# Patient Record
Sex: FEMALE
[De-identification: names, ages, dates, MRNs, and addresses within clinical notes are randomized per-mention and may not be internally consistent; named-entity substitution may affect disease eponyms.]

---

## 2019-09-20 ENCOUNTER — HOSPITAL ENCOUNTER (EMERGENCY)
Dept: HOSPITAL 97 - ER | Age: 54
Discharge: HOME | End: 2019-09-20
Payer: COMMERCIAL

## 2019-09-20 VITALS — OXYGEN SATURATION: 98 % | TEMPERATURE: 97.7 F | SYSTOLIC BLOOD PRESSURE: 133 MMHG | DIASTOLIC BLOOD PRESSURE: 84 MMHG

## 2019-09-20 DIAGNOSIS — R05: ICD-10-CM

## 2019-09-20 DIAGNOSIS — R10.84: Primary | ICD-10-CM

## 2019-09-20 LAB
ALBUMIN SERPL BCP-MCNC: 4.2 G/DL (ref 3.4–5)
ALP SERPL-CCNC: 113 U/L (ref 45–117)
ALT SERPL W P-5'-P-CCNC: 28 U/L (ref 12–78)
AST SERPL W P-5'-P-CCNC: 22 U/L (ref 15–37)
BUN BLD-MCNC: 13 MG/DL (ref 7–18)
GLUCOSE SERPLBLD-MCNC: 88 MG/DL (ref 74–106)
HCT VFR BLD CALC: 40.4 % (ref 36–45)
INR BLD: 1.04
LYMPHOCYTES # SPEC AUTO: 2.1 K/UL (ref 0.7–4.9)
MAGNESIUM SERPL-MCNC: 2.4 MG/DL (ref 1.8–2.4)
NT-PROBNP SERPL-MCNC: 64 PG/ML (ref ?–125)
PMV BLD: 10.5 FL (ref 7.6–11.3)
POTASSIUM SERPL-SCNC: 3.8 MMOL/L (ref 3.5–5.1)
RBC # BLD: 4.42 M/UL (ref 3.86–4.86)
TROPONIN (EMERG DEPT USE ONLY): < 0.02 NG/ML (ref 0–0.04)

## 2019-09-20 PROCEDURE — 76705 ECHO EXAM OF ABDOMEN: CPT

## 2019-09-20 PROCEDURE — 85610 PROTHROMBIN TIME: CPT

## 2019-09-20 PROCEDURE — 93005 ELECTROCARDIOGRAM TRACING: CPT

## 2019-09-20 PROCEDURE — 84484 ASSAY OF TROPONIN QUANT: CPT

## 2019-09-20 PROCEDURE — 85025 COMPLETE CBC W/AUTO DIFF WBC: CPT

## 2019-09-20 PROCEDURE — 80076 HEPATIC FUNCTION PANEL: CPT

## 2019-09-20 PROCEDURE — 99284 EMERGENCY DEPT VISIT MOD MDM: CPT

## 2019-09-20 PROCEDURE — 83735 ASSAY OF MAGNESIUM: CPT

## 2019-09-20 PROCEDURE — 80048 BASIC METABOLIC PNL TOTAL CA: CPT

## 2019-09-20 PROCEDURE — 83880 ASSAY OF NATRIURETIC PEPTIDE: CPT

## 2019-09-20 PROCEDURE — 36415 COLL VENOUS BLD VENIPUNCTURE: CPT

## 2019-09-20 PROCEDURE — 71045 X-RAY EXAM CHEST 1 VIEW: CPT

## 2019-09-20 NOTE — RAD REPORT
EXAM DESCRIPTION:  RAD - Chest Single View - 9/20/2019 12:25 pm

 

CLINICAL HISTORY:  Chest and abdominal pain, known situs inversus

 

COMPARISON:  None.

 

TECHNIQUE:  AP portable chest image was obtained 1217 hours .

 

FINDINGS:  Lung fields are clear. Heart size and vasculature are normal. Heart and aortic arch are on
 the right on this film. Provided information indicates that situs inversus is a known finding in thi
s patient. No measurable pleural effusion and no pneumothorax. No acute bony abnormality seen. No acu
te aortic findings suspected.

 

IMPRESSION:  No acute cardiopulmonary process.

## 2019-09-20 NOTE — RAD REPORT
EXAM DESCRIPTION:  US - Abdomen Exam Limited - 9/20/2019 1:27 pm

 

CLINICAL HISTORY:  ABD PAIN

 

COMPARISON:  No comparisons

 

FINDINGS:  No gallstones, sludge or other abnormalities within the gallbladder lumen. There is no wal
l thickening or pericholecystic fluid.

 

No common duct stone or biliary tree dilatation identified.

 

IMPRESSION:  Normal gallbladder and biliary tree ultrasound.

## 2019-09-20 NOTE — EDPHYS
Physician Documentation                                                                           

 [facility]                                                                 

Name: Brenda Terry                                                                             

Age: 54 yrs                                                                                       

Sex: Female                                                                                       

: 1965                                                                                   

MRN: [id number]                                                                                   

Arrival Date: 2019                                                                          

Time: 10:15                                                                                       

Account#: [id number]                                                                            

Bed 19                                                                                            

Private MD: Unknown, Unknown                                                                      

ED Physician Wally Brooks                                                                       

HPI:                                                                                              

                                                                                             

11:57 This 54 yrs old  Female presents to ER via Ambulatory with complaints of        snw 

      Abdominal Pain, Chest Pain.                                                                 

11:57 The patient presents with abdominal pain in the epigastric area, in the upper abdomen,  snw 

      in the left upper quadrant. Onset: The symptoms/episode began/occurred gradually, 2         

      day(s) ago, and became persistent. The symptoms do not radiate. Associated signs and        

      symptoms: none. The symptoms are described as crampy. Severity of pain: At its worst        

      the pain was moderate. It is unknown whether or not the patient has had similar             

      symptoms in the past. It is unknown whether or not the patient has recently seen a          

      physician.                                                                                  

                                                                                                  

OB/GYN:                                                                                           

10:35 LMP N/A - Hysterectomy                                                                  aj1 

                                                                                                  

Historical:                                                                                       

- Allergies:                                                                                      

10:35 No Known Allergies;                                                                     aj1 

- Home Meds:                                                                                      

10:35 None [Active];                                                                          aj1 

- PMHx:                                                                                           

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 

- PSHx:                                                                                           

10:35 Hysterectomy;                                                                           aj1 

                                                                                                  

- Immunization history:: Flu vaccine is not up to date.                                           

- Social history:: Smoking status: Patient/guardian denies using tobacco.                         

- Ebola Screening: : Patient denies travel to an Ebola-affected area in the 21 days               

  before illness onset.                                                                           

                                                                                                  

                                                                                                  

ROS:                                                                                              

11:57 Constitutional: Negative for fever, chills, and weight loss, Eyes: Negative for injury, snw 

      pain, redness, and discharge, ENT: Negative for injury, pain, and discharge, Neck:          

      Negative for injury, pain, and swelling, Cardiovascular: Negative for chest pain,           

      palpitations, and edema, Abdomen/GI: Negative for nausea, vomiting, diarrhea, and           

      constipation, + upper left abd pain Back: Negative for injury and pain.                     

11:57 : Negative for injury, bleeding, discharge, and swelling, MS/Extremity: Negative for      

      injury and deformity, Skin: Negative for injury, rash, and discoloration, Neuro:            

      Negative for headache, weakness, numbness, tingling, and seizure, Psych: Negative for       

      depression, anxiety, suicide ideation, homicidal ideation, and hallucinations.              

11:57 Respiratory: Positive for cough, with no reported sputum.                                   

                                                                                                  

Exam:                                                                                             

11:57 Constitutional:  This is a well developed, well nourished patient who is awake, alert,  snw 

      and in no acute distress. Head/Face:  Normocephalic, atraumatic. Eyes:  Pupils equal        

      round and reactive to light, extra-ocular motions intact.  Lids and lashes normal.          

      Conjunctiva and sclera are non-icteric and not injected.  Cornea within normal limits.      

      Periorbital areas with no swelling, redness, or edema. ENT:  Nares patent. No nasal         

      discharge, no septal abnormalities noted.  Tympanic membranes are normal and external       

      auditory canals are clear.  Oropharynx with no redness, swelling, or masses, exudates,      

      or evidence of obstruction, uvula midline.  Mucous membranes moist. Neck:  Trachea          

      midline, no thyromegaly or masses palpated, and no cervical lymphadenopathy.  Supple,       

      full range of motion without nuchal rigidity, or vertebral point tenderness.  No            

      Meningismus. Cardiovascular:  Regular rate and rhythm with a normal S1 and S2.  No          

      gallops, murmurs, or rubs.  Normal PMI, no JVD.  No pulse deficits. Respiratory:  Lungs     

      have equal breath sounds bilaterally, clear to auscultation and percussion.  No rales,      

      rhonchi or wheezes noted.  No increased work of breathing, no retractions or nasal          

      flaring. Abdomen/GI:  Soft, non-tender, with normal bowel sounds.  No distension or         

      tympany.  No guarding or rebound.  No evidence of tenderness throughout. Back:  No          

      spinal tenderness.  No costovertebral tenderness.  Full range of motion. Skin:  Warm,       

      dry with normal turgor.  Normal color with no rashes, no lesions, and no evidence of        

      cellulitis. MS/ Extremity:  Pulses equal, no cyanosis.  Neurovascular intact.  Full,        

      normal range of motion. Neuro:  Awake and alert, GCS 15, oriented to person, place,         

      time, and situation.  Cranial nerves II-XII grossly intact.  Motor strength 5/5 in all      

      extremities.  Sensory grossly intact.  Cerebellar exam normal.  Normal gait. Psych:         

      Awake, alert, with orientation to person, place and time.  Behavior, mood, and affect       

      are within normal limits.                                                                   

11:57 Chest/axilla: Inspection: normal, Palpation: is normal.                                     

                                                                                                  

Vital Signs:                                                                                      

10:35  / 84; Pulse 55; Resp 18; Temp 97.7; Pulse Ox 98% on R/A; Weight 81.65 kg (R);    aj1 

      Height 5 ft. 4 in. (162.56 cm) (R); Pain 7/10;                                              

12:05  / 75; Pulse 50; Resp 16 S; Pulse Ox 100% on R/A;                                 aa5 

13:00  / 71; Pulse 61; Resp 16 S; Pulse Ox 100% on R/A;                                 aa5 

14:45  / 68; Pulse 61; Resp 16 S; Temp 98.0(TE); Pulse Ox 98% on R/A; Pain 0/10;        aa5 

16:00  / 88; Pulse 52; Resp 16 S; Pulse Ox 98% on R/A;                                  aa5 

17:00  / 74; Pulse 52; Resp 18 S; Temp 97.8(TE); Pulse Ox 99% on R/A; Pain 0/10;        aa5 

10:35 Body Mass Index 30.90 (81.65 kg, 162.56 cm)                                             aj1 

                                                                                                  

MDM:                                                                                              

11:39 Patient medically screened.                                                             snw 

16:26 Data reviewed: vital signs, nurses notes. Data interpreted: Pulse oximetry: on room air snw 

      is 98 %. Interpretation: normal. Counseling: I had a detailed discussion with the           

      patient and/or guardian regarding: the historical points, exam findings, and any            

      diagnostic results supporting the discharge/admit diagnosis, the presence of at least       

      one elevated blood pressure reading (>120/80) during this emergency department visit,       

      lab results, radiology results, the need for outpatient follow up, to return to the         

      emergency department if symptoms worsen or persist or if there are any questions or         

      concerns that arise at home. Special discussion: Based on the history and exam              

      findings, there is no indication for further emergent testing or inpatient evaluation.      

      I discussed with the patient/guardian the need to see the primary care provider for         

      further evaluation of the symptoms.                                                         

                                                                                                  

                                                                                             

11:43 Order name: Basic Metabolic Panel; Complete Time: 12:39                                 snw 

                                                                                             

11:43 Order name: CBC with Diff; Complete Time: 12:18                                         snw 

                                                                                             

11:43 Order name: LFT's; Complete Time: 12:39                                                 snw 

                                                                                             

11:43 Order name: Magnesium; Complete Time: 12:39                                             w 

                                                                                             

11:43 Order name: NT PRO-BNP; Complete Time: 12:39                                                                                                                                         

11:43 Order name: PT-INR; Complete Time: 12:18                                                                                                                                             

11:43 Order name: Troponin (emerg Dept Use Only); Complete Time: 12:39                                                                                                                     

11:43 Order name: XRAY Chest (1 view); Complete Time: 12:57                                                                                                                                

11:43 Order name: EKG; Complete Time: 11:47                                                                                                                                                

11:43 Order name: Cardiac monitoring; Complete Time: 11:59                                                                                                                                 

11:43 Order name: EKG - Nurse/Tech; Complete Time: 11:59                                                                                                                                   

12:49 Order name: US Abdomen Limited; Complete Time: 14:36                                                                                                                                 

15:19 Order name: EKG; Complete Time: 15:20                                                                                                                                                

15:19 Order name: Troponin (emerg Dept Use Only); Complete Time: 16:24                                                                                                                     

11:43 Order name: IV Saline Lock; Complete Time: 11:59                                                                                                                                     

11:43 Order name: Labs collected and sent; Complete Time: 11:59                                                                                                                            

11:43 Order name: O2 Per Protocol; Complete Time: 11:59                                                                                                                                    

11:43 Order name: O2 Sat Monitoring; Complete Time: 11:59                                     snw 

                                                                                                  

Administered Medications:                                                                         

No medications were administered                                                                  

                                                                                                  

                                                                                                  

Disposition:                                                                                      

19 16:25 Discharged to Home. Impression: Colic.                                             

- Condition is Stable.                                                                            

- Discharge Instructions: Biliary Colic, Adult, Fat and Cholesterol Restricted Diet,              

  High-Fiber Diet.                                                                                

- Prescriptions for Bentyl 20 mg Oral Tablet - take 1 tablet by ORAL route every 6                

  hours As needed; 20 tablet.                                                                     

- Work release form, Family Work Release, Medication Reconciliation Form, Thank You               

  Letter, Antibiotic Education, Prescription Opioid Use form.                                     

- Follow up: Private Physician; When: 2 - 3 days; Reason: Recheck today's complaints,             

  Continuance of care, Re-evaluation by your physician. Follow up: Emergency                      

  Department; When: As needed; Reason: Worsening of condition.                                    

                                                                                                  

                                                                                                  

                                                                                                  

Addendum:                                                                                         

2019                                                                                        

     08:58 Co-signature as Attending Physician, Wally Brooks MD I agree with the assessment and   k
dr

           plan of care.                                                                          

                                                                                                  

Signatures:                                                                                       

Dispatcher [employer]                           EDSharon Comer, RN                     RN   aj1                                                  

Wally Brooks MD MD   [facility]                                                  

Cherry Regalado, FNP-C                 FNP-Csnw                                                  

Alisha Maya, RN                     RN   aa5                                                  

                                                                                                  

Corrections: (The following items were deleted from the chart)                                    

                                                                                             

17:16 16:25 2019 16:25 Discharged to Home. Impression: Colic. Condition is Stable.      aa5 

      Forms are Medication Reconciliation Form, Thank You Letter, Antibiotic Education,           

      Prescription Opioid Use. Follow up: Private Physician; When: 2 - 3 days; Reason:            

      Recheck today's complaints, Continuance of care, Re-evaluation by your physician.           

      Follow up: Emergency Department; When: As needed; Reason: Worsening of condition. snw       

                                                                                                  

**************************************************************************************************

## 2019-09-20 NOTE — ER
Nurse's Notes                                                                                     

 [facility]                                                                 

Name: Brenda Terry                                                                             

Age: 54 yrs                                                                                       

Sex: Female                                                                                       

: 1965                                                                                   

MRN: [id number]                                                                                   

Arrival Date: 2019                                                                          

Time: 10:15                                                                                       

Account#: [id number]                                                                            

Bed 19                                                                                            

Private MD: Unknown, Unknown                                                                      

Diagnosis: Colic                                                                                  

                                                                                                  

Presentation:                                                                                     

                                                                                             

10:32 Presenting complaint: Child states: "She's having pain in her right lower chest and     aj1 

      upper abdomen, it started 2 days ago, and it radiates from her back to the front"           

      Denies shortness of breath, palpitations, fever. Reports that the pain is worse with        

      movement. Transition of care: patient was not received from another setting of care.        

      Onset of symptoms was 2019. Risk Assessment: Do you want to hurt yourself or      

      someone else? Patient reports no desire to harm self or others. Initial Sepsis Screen:      

      Does the patient meet any 2 criteria? No. Patient's initial sepsis screen is negative.      

      Does the patient have a suspected source of infection? No. Patient's initial sepsis         

      screen is negative. Care prior to arrival: None.                                            

10:32 Method Of Arrival: Ambulatory                                                           aj1 

10:32 Acuity: JESUS 3                                                                           aj1 

                                                                                                  

Triage Assessment:                                                                                

10:35 General: Appears in no apparent distress. comfortable, Behavior is calm, cooperative,   aj1 

      appropriate for age. Pain: Complains of pain in diaphragm and left upper quadrant Pain      

      radiates to back Pain currently is 7 out of 10 on a pain scale. Quality of pain is          

      described as pressure, Pain began 2-3 days ago. Is intermittent. Neuro: Level of            

      Consciousness is awake, alert, obeys commands, Oriented to person, place, time,             

      situation. Cardiovascular: Reports chest pain, Denies palpitations, shortness of            

      breath, Patient's skin is warm and dry. Respiratory: Airway is patent Respiratory           

      effort is even, unlabored, Respiratory pattern is regular, symmetrical. GI: Reports         

      upper abdominal pain, Patient currently denies diarrhea, nausea, vomiting.                  

                                                                                                  

OB/GYN:                                                                                           

10:35 LMP N/A - Hysterectomy                                                                  aj1 

                                                                                                  

Historical:                                                                                       

- Allergies:                                                                                      

10:35 No Known Allergies;                                                                     aj1 

- Home Meds:                                                                                      

10:35 None [Active];                                                                          aj1 

- PMHx:                                                                                           

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 

- PSHx:                                                                                           

10:35 Hysterectomy;                                                                           aj1 

                                                                                                  

- Immunization history:: Flu vaccine is not up to date.                                           

- Social history:: Smoking status: Patient/guardian denies using tobacco.                         

- Ebola Screening: : Patient denies travel to an Ebola-affected area in the 21 days               

  before illness onset.                                                                           

                                                                                                  

                                                                                                  

Screening:                                                                                        

10:40 Abuse screen: Denies threats or abuse. Nutritional screening: No deficits noted.        aa5 

      Tuberculosis screening: No symptoms or risk factors identified. Fall Risk None              

      identified.                                                                                 

                                                                                                  

Assessment:                                                                                       

11:40 General: Appears comfortable, Behavior is calm, cooperative. Pain: Complains of pain in aa5 

      left mid back Pain radiates to right upper quadrant and left upper quadrant Pain            

      currently is 5 out of 10 on a pain scale. Quality of pain is described as sharp, Is         

      intermittent. Neuro: Level of Consciousness is awake, alert, obeys commands, Oriented       

      to person, place, time, situation. Cardiovascular: Denies chest pain, diaphoresis,          

      lightheadedness, nausea, palpitations, shortness of breath, syncope, vomiting, Heart        

      tones S1 S2 present Capillary refill < 3 seconds is brisk in bilateral fingers              

      Patient's skin is warm and dry. Edema is absent. Rhythm is regular. Respiratory:            

      Reports intermittent slight cough. Airway is patent Respiratory effort is even,             

      unlabored, Respiratory pattern is regular, symmetrical, Breath sounds are clear             

      bilaterally. GI: Abdomen is round non-distended, Bowel sounds present X 4 quads. Abd is     

      soft and non tender X 4 quads. Patient currently denies nausea, vomiting. : No signs      

      and/or symptoms were reported regarding the genitourinary system. EENT: No signs and/or     

      symptoms were reported regarding the EENT system. Derm: Skin is pink, warm \T\ dry.         

      Musculoskeletal: Range of motion: intact in all extremities.                                

12:30 Reassessment: Patient is alert, oriented x 3, equal unlabored respirations, skin        aa5 

      warm/dry/pink.                                                                              

12:30 Pain: Pain currently is 4 out of 10 on a pain scale.                                    aa5 

13:30 Reassessment: Patient is alert, oriented x 3, equal unlabored respirations, skin        aa5 

      warm/dry/pink. Patient denies pain at this time.                                            

14:40 Reassessment: Patient is alert, oriented x 3, equal unlabored respirations, skin        aa5 

      warm/dry/pink. Patient denies pain at this time.                                            

15:32 Reassessment: Patient is alert, oriented x 3, equal unlabored respirations, skin        aa5 

      warm/dry/pink. Patient denies pain at this time. Repeat EKG completed by EKG tech and       

      repeat troponin drawn and sent to lab. .                                                    

16:30 Reassessment: Patient is alert, oriented x 3, equal unlabored respirations, skin        aa5 

      warm/dry/pink. Patient denies pain at this time.                                            

17:15 Reassessment: Patient is alert, oriented x 3, equal unlabored respirations, skin        aa5 

      warm/dry/pink. Patient denies pain at this time.                                            

                                                                                                  

Vital Signs:                                                                                      

10:35  / 84; Pulse 55; Resp 18; Temp 97.7; Pulse Ox 98% on R/A; Weight 81.65 kg (R);    aj1 

      Height 5 ft. 4 in. (162.56 cm) (R); Pain 7/10;                                              

12:05  / 75; Pulse 50; Resp 16 S; Pulse Ox 100% on R/A;                                 aa5 

13:00  / 71; Pulse 61; Resp 16 S; Pulse Ox 100% on R/A;                                 aa5 

14:45  / 68; Pulse 61; Resp 16 S; Temp 98.0(TE); Pulse Ox 98% on R/A; Pain 0/10;        aa5 

16:00  / 88; Pulse 52; Resp 16 S; Pulse Ox 98% on R/A;                                  aa5 

17:00  / 74; Pulse 52; Resp 18 S; Temp 97.8(TE); Pulse Ox 99% on R/A; Pain 0/10;        aa5 

10:35 Body Mass Index 30.90 (81.65 kg, 162.56 cm)                                             aj1 

                                                                                                  

ED Course:                                                                                        

10:15 Patient arrived in ED.                                                                  ag5 

10:16 Unknown, Unknown is Private Physician.                                                  ag5 

10:34 Triage completed.                                                                       aj1 

10:35 Arm band placed on Patient placed in waiting room, Patient notified of wait time.       aj1 

10:40 Patient has correct armband on for positive identification. Placed in gown. Bed in low  aa5 

      position. Call light in reach. Side rails up X2. Cardiac monitor on. Pulse ox on. NIBP      

      on.                                                                                         

11:28 Alisha Maya, RN is Primary Nurse.                                                   aa5 

11:37 Cherry Regalado FNP-C is PHCP.                                                        snw 

11:37 Wally Brooks MD is Attending Physician.                                              snw 

11:55 Initial lab(s) drawn, by me, sent to lab. Inserted saline lock: 20 gauge in right       aa5 

      antecubital area, using aseptic technique. Blood collected.                                 

12:28 XRAY Chest (1 view) In Process Unspecified.                                             EDMS

13:32 US Abdomen Limited In Process Unspecified.                                              EDMS

15:47 EKG done, by EKG tech. reviewed by Cherry CONTRERAS.                               sm3 

17:15 No provider procedures requiring assistance completed. IV discontinued, intact,         aa5 

      bleeding controlled, No redness/swelling at site. Pressure dressing applied.                

                                                                                                  

Administered Medications:                                                                         

No medications were administered                                                                  

                                                                                                  

                                                                                                  

Outcome:                                                                                          

16:25 Discharge ordered by MD.                                                                snw 

17:15 Discharged to home ambulatory, with family.                                             aa5 

17:15 Condition: stable                                                                           

17:15 Discharge instructions given to patient, Instructed on discharge instructions, follow       

      up and referral plans. medication usage, Demonstrated understanding of instructions,        

      follow-up care, medications, Prescriptions given X 1.                                       

17:16 Patient left the ED.                                                                    aa5 

                                                                                                  

Signatures:                                                                                       

Dispatcher [employer]                           EDSharon Comer, RN                     RN   aj1                                                  

Cherry Regalado FNP-C FNP-Alisha Husain, RN                     RN   aa5                                                  

Renae Cordoba                              sm3                                                  

aY Doe                                ag5                                                  

                                                                                                  

Corrections: (The following items were deleted from the chart)                                    

19:50 17:16 Reassessment: Patient is alert, oriented x 3, equal unlabored respirations, skin  aa5 

      warm/dry/pink. Patient denies pain at this time. aa5                                        

                                                                                                  

**************************************************************************************************

## 2019-09-21 NOTE — EKG
Test Date:    2019-09-20               Test Time:    10:38:21

Technician:   KIN                                     

                                                     

MEASUREMENT RESULTS:                                       

Intervals:                                           

Rate:         60                                     

OH:           128                                    

QRSD:         82                                     

QT:           422                                    

QTc:          422                                    

Axis:                                                

P:                                                   

OH:           128                                    

QRS:          167                                    

T:            119                                    

                                                     

INTERPRETIVE STATEMENTS:                                       

                                                     

Normal sinus rhythm

Right axis deviation

Abnormal ECG

No previous ECG available for comparison



Electronically Signed On 09-21-19 14:13:51 CDT by Lester Jordan

## 2019-09-21 NOTE — EKG
Test Date:    2019-09-20               Test Time:    15:33:14

Technician:   MARIANN                                     

                                                     

MEASUREMENT RESULTS:                                       

Intervals:                                           

Rate:         58                                     

CO:           152                                    

QRSD:         84                                     

QT:           438                                    

QTc:          429                                    

Axis:                                                

P:                                                   

CO:           152                                    

QRS:          187                                    

T:            120                                    

                                                     

INTERPRETIVE STATEMENTS:                                       

                                                     

Sinus bradycardia

Right superior axis deviation

Abnormal ECG

Compared to ECG 09/20/2019 10:38:21

Right superior axis now present

Sinus rhythm no longer present

Right-axis deviation no longer present



Electronically Signed On 09-21-19 14:13:37 CDT by Lester Jordan